# Patient Record
Sex: FEMALE | Race: WHITE
[De-identification: names, ages, dates, MRNs, and addresses within clinical notes are randomized per-mention and may not be internally consistent; named-entity substitution may affect disease eponyms.]

---

## 2020-03-10 ENCOUNTER — HOSPITAL ENCOUNTER (OUTPATIENT)
Dept: HOSPITAL 35 - PAIN | Age: 45
Discharge: HOME | End: 2020-03-10
Attending: ANESTHESIOLOGY
Payer: OTHER GOVERNMENT

## 2020-03-10 VITALS — BODY MASS INDEX: 32.2 KG/M2 | HEIGHT: 70.98 IN | WEIGHT: 230 LBS

## 2020-03-10 VITALS — DIASTOLIC BLOOD PRESSURE: 56 MMHG | SYSTOLIC BLOOD PRESSURE: 108 MMHG

## 2020-03-10 DIAGNOSIS — M19.90: ICD-10-CM

## 2020-03-10 DIAGNOSIS — F41.9: ICD-10-CM

## 2020-03-10 DIAGNOSIS — M47.26: ICD-10-CM

## 2020-03-10 DIAGNOSIS — G89.29: ICD-10-CM

## 2020-03-10 DIAGNOSIS — M48.061: ICD-10-CM

## 2020-03-10 DIAGNOSIS — Z98.890: ICD-10-CM

## 2020-03-10 DIAGNOSIS — Z79.899: ICD-10-CM

## 2020-03-10 DIAGNOSIS — M51.16: Primary | ICD-10-CM

## 2020-03-10 DIAGNOSIS — Z98.51: ICD-10-CM

## 2020-03-10 DIAGNOSIS — M47.27: ICD-10-CM

## 2020-03-10 NOTE — NUR
Pain Clinic Assessment:
 
1. History of Osteoarthritis:
Not Applicable
   History of Rheumatoid Arthritis:
Not Applicable
 
2. Height: 5 ft. 11 in. 180.3 cm.
   Weight: 230.0 lb.  oz. 104.328 kg.
   Patient's BMI: 32.1
 
3. Vital Signs:
   BP: 108/56 Pulse: 89 Resp: 16
   Temp:  02 Sat: 98 ECG Mon:
 
4. Pain Intensity: 5-6 AVG 0 NOW
 
5. Fall Risk:
   Dizziness: N  Needs help standing or walking: N
   Fallen in the last 3 months: N
   Fall risk comments:
 
 
6. Patient on Blood Thinner: None
 
7. History of Hypertension: N
 
8. Opioid Therapy greater than 6 weeks: N
   Opiate Contract Signed:
 
9. Risk Assessment Tool Provided:
 
10. Functional Assessment Tool: low risk 0-3
 
11. Recreational Drug Use: Never Drug Type:
    Tobacco Use: Former Smoker Tobacco Type: Cigarettes
       Amount or Packs/day: 1/8 How Many Years: 8
    Alcohol Use: Yes  Frequency: Special Occasions Quant: 2-3

## 2020-03-10 NOTE — HPC
[facility]
[place]   [address]                     PAIN MANAGEMENT CONSULTATION  
_______________________________________________________________________________
 
Name:       YOBANI RAZA            Room #:                     [facility].#:      [id number]                       Account #:      [id number]  
Admission:  03/10/20    Attend Phys:    Adolfo Artis DO  
Discharge:              Date of Birth:  [birth year]  
                                                          Report #: [id number]
                                                                    [id number]   
_______________________________________________________________________________
THIS REPORT FOR:  
 
cc:  JEREL - Poonam family physician/PCP 
     JEREL - Poonam family physician/PCP                                        
     Adolfo Artis DO                                          ~
CC: Dr. Yoshi BELTRÁN physician/PCP
    Adolfo Artis
 
DATE OF SERVICE:  03/10/2020
 
 
CHIEF COMPLAINT:  Low back pain, bilateral buttock and posterolateral thigh
pain.
 
HISTORY OF PRESENT ILLNESS:  As you know, the patient is a 45-year-old female
who has had a longstanding history of low back pain, buttock and posterolateral
thigh pain.  She has been evaluated by multiple physicians undergoing imaging of
the lumbar spine, which showed changes at the L4-L5 level.  She has also
undergone MRI imaging of the hip, which showed chronic labral tear, but no
osteoarthritic changes.  She has tried conservative treatment options, which
unfortunately did not provide much in the way of improvement in symptoms.  Due
to the fact that she was not seeing improvement over a period of time, she was
subsequently referred to our clinic to discuss interventional treatments to
address suspected lumbar radiculopathy.
 
The patient indicates today pain is continuous and periodic with momentary
exacerbation of symptoms.  She indicates the pain as burning, shooting,
intermittently numbness and tingling.  Today, the patient is reporting pain
score 0/10.  Her daily averages anywhere from 5-6/10 depending on activity, 9/10
is the worst pain has been.  She indicates that any type of movement of the
lower lumbar spine tends to exacerbate symptoms.  She has pain in all positions
including sitting and lying down, which essentially rules out the hip as the
pathology.  She does not have any tenderness over the ischial bursa based on her
descriptor of pain and locations of symptoms.  She has been referred to our
service to discuss treatment options for chronic back, buttock, and
posterolateral thigh pain.
 
PAST MEDICAL HISTORY:
1. Degenerative joint disease.
2. Mild osteoarthritis.
 
PAST SURGICAL HISTORY:  The patient has had a breast lumpectomy 10 years ago and
tubal ligation in 2011.
 
SOCIAL HISTORY:  The patient denies tobacco, IV or illicit drug use.  Admits to
 
 
 
[address]   [address]                     PAIN MANAGEMENT CONSULTATION  
_______________________________________________________________________________
 
Name:       YOBANI RAZA            Room #:                     [facility].#:      [id number]                       Account #:      [id number]  
Admission:  03/10/20    Attend Phys:    Adolfo Artis DO  
Discharge:              Date of Birth:  [birth year]  
                                                          Report #: [id number]
                                                                    [id number]   
_______________________________________________________________________________
2-3 alcohol beverages per week.  She is employed as a nurse.  She is working,
not receiving workmen's compensation or is trying to obtain disability benefits.
 She is unaccompanied at today's visit.
 
REVIEW OF SYSTEMS:  Positive for weight gain, fever, night sweats, wearing
corrective eyewear, hearing loss with tinnitus, earaches with drainage, chest
pain, rectal bleeding and painful menses, varicose veins, night sweats, low back
pain, bilateral buttock and posterolateral thigh pain.  All other review of
systems negative per 12-point review of systems, and those listed in history of
present illness.
 
Pain impact score 39/70 indicating moderate interference of daily activities
secondary to pain.
 
ALLERGIES:  No known drug allergies.
 
CURRENT MEDICATIONS:  Aspirin 81 mg once a day, cyclobenzaprine 10 mg t.i.d.
p.r.n., naproxen 500 mg twice a day.
 
IMAGING:  MRI lumbar spine obtained on 01/19/2020 shows L4-L5 with new disk
herniation causing no significant central canal neural foraminal stenosis. 
There are noted changes at the C3-C4 level with mild levoscoliotic curvature
associated with degenerative disk changes resulting in no significant central
canal and no lateral stenosis.  the remaining level shows some arthritic changes
typical for the patient's age and body habitus.
 
MRI of the left hip obtained on 07/02/2018 shows chronic tear of the anterior
superior labrum and changes within the uterus.
 
PHYSICAL EXAMINATION:
VITAL SIGNS:  Blood pressure 108/56, pulse 89, respiratory rate 16 and
unlabored.  The patient is 98% on room air.  Height 5 feet 11 inches tall,
weight 230 pounds and BMI calculated at 32.1.
GENERAL:  Well-developed, well-nourished, well-hydrated 45-year-old female
appearing stated age.  She is placing current pain score 0/10 at present, but up
to 5-6/10 with activity.
HEENT:  Normocephalic, atraumatic.  Pupils equal, round, reactive to light. 
Extraocular muscles are intact.  Sclerae nonicteric without injection.
NEUROLOGIC:  Cranial nerves 2-12 grossly intact.  Speech is fluent.
LUNGS:  Clear, no wheeze, rhonchi or rales.
CARDIOVASCULAR:  Regular.  No appreciable gallop, no rub.
ABDOMEN:  Soft, nontender, obese.  Normoactive bowel sounds.
EXTREMITIES:  Show no clubbing, no cyanosis, and no edema.
MUSCULOSKELETAL:  Lower extremity strength appears symmetrical 5/5.  There does
not appear to be any giveaway strength with hip flexion or knee extension
bilaterally.  Seated straight leg raising negative.  Supine straight leg raising
 
 
 
[facility]
[address] [place]   [address]                     PAIN MANAGEMENT CONSULTATION  
_______________________________________________________________________________
 
Name:       YOBANI RAZA            Room #:                     REG [facility]#:      [id number]                       Account #:      [id number]  
Admission:  03/10/20    Attend Phys:    Adolfo Artis DO  
Discharge:              Date of Birth:  [birth year]  
                                                          Report #: [id number]
                                                                    [id number]   
_______________________________________________________________________________
is equivocal.  Lumbar provocation testing is met with increase in axial back
pain, no radiation of symptoms.  Ankle clonus negative.  Babinski is negative. 
Muscle bulk and tone equal and symmetrical in comparing left lower extremity to
right.
 
ASSESSMENT:
1. Symptomatic lumbar radiculopathy.
2. Mild spinal stenosis of lumbar spine.
3. Displacement of lumbar intervertebral disk with radiculopathy.
4. Lumbosacral spondylosis with radiculopathy.
5. Facet arthropathy of the lumbar spine.
6. Lumbar degeneration.
7. Chronic intractable pain.
 
PLAN:
1. Based on today's physical exam and history the patient has provided, the
description the patient uses in regards to pain as well as location of symptoms
being bilateral in nature.  It appears the patient is suffering from lumbar
radicular symptoms.  The fact that she has waxing and waning features and pain
when seated and lying down.  This rules out the potential of the hip being the
source of the patient's symptoms.  She has a negative testing for ischial
bursitis, which was suggested in the paperwork that was not present on exam
today.  SI joints do have some palpatory tenderness, but they do not appear to
be dysfunctional.  Given the findings on the physical examination, we recommend
that the patient to address her lumbar radicular symptoms in the following
fashion.
 
We discussed physical therapy, stretching exercises, core strengthening and a
concerted effort at weight loss.  We discussed medication management utilizing
neuropathic pain medication such as nortriptyline, amitriptyline, Cymbalta,
Lyrica or gabapentin, which can be initiated by the PCP.  We discussed lumbar
epidural injection for which the patient was referred to our clinic.  We also
discussed surgical options, though.  at this point, the patient is not a
surgical candidate as the findings are not conclusive.  After reviewing the
risks and benefits of all proposed treatment options, the patient chose to move
forward with a lumbar epidural injection.
2. The patient was advised that due to third party payer restrictions,
authorization would have to be obtained before the patient could undergo a
lumbar epidural injection.  Authorization could take anywhere from 4-7 working
days.  We will begin this process immediately and contact the patient once that
has been completed.
3. No medication changes made at today's visit.  The patient will continue
current medical therapy as previously prescribed.
4. We will see the patient back in followup visit once we have achieved
authorization to undergo lumbar epidural injection under fluoroscopic guidance.
5. We wish to thank Dr. Belle for the opportunity to see this patient in
 
 
 
[place] [address]                     PAIN MANAGEMENT CONSULTATION  
_______________________________________________________________________________
 
Name:       YOBANI RAZA            Room #:                     [facility]#:      [id number]                       Account #:      [id number]  
Admission:  03/10/20    Attend Phys:    Adolfo Artis DO  
Discharge:              Date of Birth:  [birth year]  
                                                          Report #: [id number]
                                                                    [id number]   
_______________________________________________________________________________
consultation.  We will keep you apprised of response to treatment as we address
lumbar radicular symptoms.  Again, we wish to thank you for the opportunity to
see the patient in consultation.
 
 
ADDENDUM:
 
We were able to obtain authorization for the patient to undergo the epidural
injection.  We had received some information that indicated the patient did not
require authorization after all.  We then subsequently consented the patient to
undergo a lumbar epidural injection.  She was advised risks and benefits of this
procedure.  These risks include but are not necessarily limited to bleeding,
bruising, infection, worsening pain, no relief of pain, also risk of temporary
or permanent muscle weakness, temporary or permanent nerve damage, possible
paralysis and death.  The patient states understood and wished to proceed.
 
DESCRIPTION OF PROCEDURE:  L5-S1 interlaminar epidural steroid injection under
fluoroscopic guidance.
 
After obtaining written consent, the patient was taken back to fluoroscopy
suite, placed in prone position with pillow under abdomen to decrease lumbar
lordosis.  Skin overlying lumbosacral area was then prepped and draped in
aseptic fashion.  The L5-S1 vertebral interspace was identified by AP
fluoroscopy.  Skin and subcutaneous tissue overlying target site of injection
were anesthetized with 3 mL of 1% lidocaine.
 
A 20-gauge 3-1/2 inch Tuohy needle was advanced under fluoroscopic guidance
towards the epidural space using a midline approach.  Epidural space was
identified using loss of resistance to air technique.  After negative aspiration
for heme or cerebrospinal fluid, 1 mL of Omnipaque was injected.  Lumbar
epidurogram was confirmed using both AP and lateral fluoroscopy.  After negative
aspiration for heme or cerebrospinal fluid, 5 mL of a solution containing 2 mL
40 mg per mL, 80 mg total triamcinolone along with 3 mL of lidocaine 1% injected
slowly.  Needle was retracted longterm, flushed with 1 mL of 1% lidocaine and
then removed.  Sterile bandage was placed over injection site.  No new motor
deficits present in lower extremity following procedure.
 
The patient tolerated the procedure well, carefully escorted to recovery room in
stable condition.  No apparent complications.  After meeting our discharge
criteria, the patient was discharged home.
 
 
 
 
                         
   By:                               
                   
D: 03/11/20 1249                           _____________________________________
T: 03/11/20 2027                           Adolfo Artis, DO            /nt

## 2021-06-08 ENCOUNTER — HOSPITAL ENCOUNTER (OUTPATIENT)
Dept: HOSPITAL 35 - PAIN | Age: 46
End: 2021-06-08
Attending: ANESTHESIOLOGY
Payer: COMMERCIAL

## 2021-06-08 VITALS — DIASTOLIC BLOOD PRESSURE: 78 MMHG | SYSTOLIC BLOOD PRESSURE: 125 MMHG

## 2021-06-08 VITALS — WEIGHT: 238.4 LBS | HEIGHT: 70.98 IN | BODY MASS INDEX: 33.38 KG/M2

## 2021-06-08 DIAGNOSIS — G89.4: ICD-10-CM

## 2021-06-08 DIAGNOSIS — Z79.891: ICD-10-CM

## 2021-06-08 DIAGNOSIS — M47.26: Primary | ICD-10-CM

## 2021-06-08 DIAGNOSIS — Z79.899: ICD-10-CM

## 2021-06-08 NOTE — NUR
Pain Clinic Assessment:
 
1. History of Osteoarthritis:
Not Applicable
   History of Rheumatoid Arthritis:
Not Applicable
 
2. Height: 5 ft. 11 in. 180.3 cm.
   Weight: 238.4 lb.  oz. 108.138 kg.
   Patient's BMI: 33.3
 
3. Vital Signs:
   BP: 125/78 Pulse: 74 Resp: 16
   Temp:  02 Sat: 100 ECG Mon:
 
4. Pain Intensity: 7-8 W/ACTIVITY
 
5. Fall Risk:
   Dizziness: N  Needs help standing or walking: N
   Fallen in the last 3 months: N
   Fall risk comments:
 
 
6. Patient on Blood Thinner: None
 
7. History of Hypertension: N
 
8. Opioid Therapy greater than 6 weeks: N
   Opiate Contract Signed:
 
9. Risk Assessment Tool Provided:
 
10. Functional Assessment Tool: low risk 0-3
 
11. Recreational Drug Use: Never Drug Type:
    Tobacco Use: Former Smoker Tobacco Type:
       Amount or Packs/day:  How Many Years:
    Alcohol Use: Yes  Frequency:  Quant:

## 2021-06-15 NOTE — HPC
[address]   [address]                     PAIN MANAGEMENT CONSULTATION  
_______________________________________________________________________________
 
Name:       YOBANI RAZA            Room #:                     REG [facility].#:      [id number]                       Account #:      [id number]  
Admission:  06/08/21    Attend Phys:    Adolfo Artis DO  
Discharge:              Date of Birth:  [birth year]  
                                                          Report #: [id number]
                                                                    [id number] 
_______________________________________________________________________________
THIS REPORT FOR:  
 
cc:  Yon Prasad MD, Neal A. MD Johnson, James E. DO                                          ~
DOC #: [id number]
 
cc:     MD Adolfo Rome DO
DATE OF SERVICE: 06/08/2021
 
REFERRING PHYSICIAN:  Yon Prasad MD
 
CHIEF COMPLAINT:  Low back pain, left lower extremity pain with paresthesias.
 
HISTORY OF PRESENT ILLNESS:  As you know, the patient is a very pleasant 
46-year-old female who was referred to our service for chronic low back pain, 
left lower extremity pain with paresthesias.  She underwent a lumbar epidural 
injection under fluoroscopic guidance at our visit of 03/10/2020 with excellent 
benefit in overall pain.  In fact, the patient reported 90% improvement in 
overall pain lasting until just recently where she had a slow and progressive 
return of symptoms.  She returns today in followup visit to begin the 
authorization process to undergo next in the series of lumbar epidural 
injections.  The patient denies any specific injury or trauma that may have led 
to symptom development.  She is very pleased with response to the initial 
injection, returning today to begin the authorization process to undergo the 
next in the series in hopes of continued analgesic benefit.
 
ALLERGIES:  No known drug allergies.
 
CURRENT MEDICATIONS:  Naproxen 500 mg b.i.d., aspirin 81 mg a day, 
cyclobenzaprine 10 mg t.i.d. p.r.n.
 
SOCIAL HISTORY:  The patient denies tobacco, alcohol or IV or illicit drug use. 
She is unaccompanied at today's visit.
 
IMAGING STUDIES:  No new imaging available.
 
PHYSICAL EXAMINATION:
VITAL SIGNS:  Blood pressure 125/78, pulse 74, respiratory rate 16 and 
unlabored.  The patient 100% on room air.  Height 5 feet 11 inches tall, weight 
238.4 pounds, BMI calculated 33.3.
GENERAL:  Well-developed, well-nourished, well-hydrated exogenously obese 
46-year-old female appearing stated age, pain is rated today around 7-8/10 with 
activity.
HEENT:  Normocephalic and atraumatic.  Pupils are equal, round and responsive to
 
 
 
[facility]
[address] [place] [address]                     PAIN MANAGEMENT CONSULTATION  
_______________________________________________________________________________
 
Name:       YOBANI RAZA            [facility] #[facility]                     [facility]      [id number]                       Account #:      [id number]  
Admission:  06/08/21    Attend Phys:    Adolfo Artis DO  
Discharge:              Date of Birth:  [birth year]  
                                                          Report #: [id number]
                                                                    [id number] 
_______________________________________________________________________________
light.  Extraocular muscles are intact.  Speech fluent.  The patient is wearing 
a mask in compliance with COVID-19 regulations.
EXTREMITIES:  Show no clubbing, no cyanosis, no appreciable edema.
MUSCULOSKELETAL:  Lower extremity strength equal and symmetrical 5/5.  Muscle 
bulk and tone equal and symmetrical comparing left lower extremity to right.  
Seated straight leg raising negative.  Supine straight leg raising mildly 
positive.  Fabere's test is negative.  Modified Gaenslen's positive for axial 
low back pain.  Ankle clonus negative.  Babinski is negative.  Lumbar 
provocation testing is met with slight increase in axial pain.
 
ASSESSMENT:
1.  Symptomatic lumbar radiculopathy.
2.  Lumbosacral spondylosis with radiculopathy.
3.  Chronic intractable pain.
 
PLAN:
1.  The patient returns today in followup visit indicating a 90% improvement in 
overall pain with the epidural injection provided in 03/2020.  She has been 
doing very well from a standpoint of pain control until just recently where she 
had a slow and progressive return of symptoms.  She returns today to begin the 
process of beginning authorizations to undergo next in the series of epidural 
injections.  I did advise the patient that authorization could take anywhere 
from 4-7 working days, will begin that process immediately.  Once this has been 
completed, we will have the patient return to undergo the second in the series 
of epidural injections.  We are pleased to see the patient has done well with 
the initial injection hopeful that the second injection will also provide 
similar long-term analgesic benefit.
2.  No medication changes made at today's visit.  The patient will continue 
current medical therapy as prior prescribed.
3.  We plan to see the patient back in followup visit once we have achieved 
authorization for the patient to undergo the next in the series of lumbar 
epidural injections.
 
Adolfo Artis DO
JEJ/ALL
 
D: 06/08/2021 03:57 PM
T: 06/08/2021 11:21 PM
 
 
 
 
 
 
  <ELECTRONICALLY SIGNED>
   By: Adolfo Artis DO          
  06/15/21     0933
D: 06/08/21 1457                           _____________________________________
T: 06/08/21 2221                           Adolfo Artis DO            /nt